# Patient Record
Sex: FEMALE | Race: ASIAN
[De-identification: names, ages, dates, MRNs, and addresses within clinical notes are randomized per-mention and may not be internally consistent; named-entity substitution may affect disease eponyms.]

---

## 2017-06-19 ENCOUNTER — HOSPITAL ENCOUNTER (EMERGENCY)
Dept: HOSPITAL 80 - FED | Age: 37
Discharge: HOME | End: 2017-06-19
Payer: COMMERCIAL

## 2017-06-19 VITALS — SYSTOLIC BLOOD PRESSURE: 125 MMHG | TEMPERATURE: 98.4 F | OXYGEN SATURATION: 96 % | DIASTOLIC BLOOD PRESSURE: 69 MMHG

## 2017-06-19 VITALS — RESPIRATION RATE: 16 BRPM | HEART RATE: 84 BPM

## 2017-06-19 DIAGNOSIS — R11.2: Primary | ICD-10-CM

## 2017-06-19 DIAGNOSIS — F41.9: ICD-10-CM

## 2017-06-19 LAB
% IMMATURE GRANULYOCYTES: 0.4 % (ref 0–1.1)
ABSOLUTE IMMATURE GRANULOCYTES: 0.06 10^3/UL (ref 0–0.1)
ABSOLUTE NRBC COUNT: 0 10^3/UL (ref 0–0.01)
ADD DIFF?: NO
ADD MORPH?: NO
ADD SCAN?: NO
ALBUMIN SERPL-MCNC: 3.8 G/DL (ref 3.5–5)
ALP SERPL-CCNC: 54 IU/L (ref 38–126)
ALT SERPL-CCNC: 50 IU/L (ref 9–52)
ANION GAP SERPL CALC-SCNC: 11 MEQ/L (ref 8–16)
AST SERPL-CCNC: 36 IU/L (ref 14–46)
ATYPICAL LYMPHOCYTE FLAG: 0 (ref 0–99)
BILIRUB SERPL-MCNC: 0.6 MG/DL (ref 0.1–1.4)
BILIRUBIN-CONJUGATED: 0.3 MG/DL (ref 0–0.5)
BILIRUBIN-UNCONJUGATED: 0.3 MG/DL (ref 0–1.1)
CALCIUM SERPL-MCNC: 9.5 MG/DL (ref 8.5–10.4)
CHLORIDE SERPL-SCNC: 104 MEQ/L (ref 97–110)
CO2 SERPL-SCNC: 24 MEQ/L (ref 22–31)
COLOR UR: YELLOW
CREAT SERPL-MCNC: 0.5 MG/DL (ref 0.6–1)
ERYTHROCYTE [DISTWIDTH] IN BLOOD BY AUTOMATED COUNT: 12.3 % (ref 11.5–15.2)
FRAGMENT RBC FLAG: 0 (ref 0–99)
GFR SERPL CREATININE-BSD FRML MDRD: > 60 ML/MIN/{1.73_M2}
GLUCOSE SERPL-MCNC: 111 MG/DL (ref 70–100)
HCT VFR BLD CALC: 40.8 % (ref 38–47)
HGB BLD-MCNC: 13.8 G/DL (ref 12.6–16.3)
LEFT SHIFT FLG: 0 (ref 0–99)
LIPEMIA HEMOLYSIS FLAG: 90 (ref 0–99)
MCH RBC BLDCO QN: 29.7 PG (ref 27.9–34.1)
MCHC RBC AUTO-ENTMCNC: 33.8 G/DL (ref 32.4–36.7)
MCV RBC AUTO: 87.9 FL (ref 81.5–99.8)
NITRITE UR QL STRIP: NEGATIVE
NRBC-AUTO%: 0 % (ref 0–0.2)
PH UR STRIP: 5 [PH] (ref 5–7.5)
PLATELET # BLD: 291 10^3/UL (ref 150–400)
PLATELET CLUMPS FLAG: 20 (ref 0–99)
PMV BLD AUTO: 9 FL (ref 8.7–11.7)
POTASSIUM SERPL-SCNC: 3.7 MEQ/L (ref 3.5–5.2)
PROT SERPL-MCNC: 7.4 G/DL (ref 6.3–8.2)
RBC # BLD AUTO: 4.64 10^6/UL (ref 4.18–5.33)
SODIUM SERPL-SCNC: 139 MEQ/L (ref 134–144)
SP GR UR STRIP: 1.02 (ref 1–1.03)
TROPONIN I SERPL-MCNC: < 0.012 NG/ML (ref 0–0.03)

## 2017-06-19 NOTE — EDPHY
H & P


Stated Complaint: N,V CHEST AND ARM PAIN WITH SOB AFTER EATING SALMON


HPI/ROS: 





HPI





CHIEF COMPLAINT:  Nausea, vomiting, shortness of breath, anterior chest wall 

pain after eating salmon





HISTORY OF PRESENT ILLNESS:  this patient very pleasant 37-year-old female, 

denies any significant medical history does not take any daily medications, no 

history of DVT PE or cardiac disease, presents emergency room after eating 

salmon last night a from the grocery store she states approximately an hour 

after eating she got nauseous and vomited twice.  She states she ate the same 

around 9:00 p.m. last night.  Around 10:00 p.m. she became sick.  She states 

all night long she has felt anxious, she felt as if she has had shortness of 

breath and anterior chest wall discomfort with pain when you touch her chest 

wall.  States her muscles hurt her as well. Denies diarrhea.  She does tell me 

also her abdomen is bloated.  No diarrhea.  No fever.  Has had chills as well.  

Denies pleuritic pain.  Or hemoptysis.  Denies calf pain or swelling. Denies 

cold or heat reversal.





Past Medical History:  No significant medical history





Past Surgical History:  No recent surgical history





Social History:  Denies daily use of drugs alcohol tobacco products





Family History:  Father has heart disease








ROS   


REVIEW OF SYSTEMS:


A comprehensive 10 point review of systems is otherwise negative aside from 

elements mentioned in the history of present illness.








Exam   


Constitutional  anxious, triage nursing summary reviewed, vital signs reviewed, 

awake/alert. 


Eyes   normal conjunctivae and sclera, EOMI, PERRLA. 


HENT   normal inspection, atraumatic, moist mucus membranes, no epistaxis, neck 

supple/ no meningismus, no raccoon eyes. 


Respiratory   clear to auscultation bilaterally, normal breath sounds, no 

respiratory distress, no wheezing. 


Cardiovascular chest wall:  Tender palpation over the anterior chest wall 

bilaterally.  rate normal, regular rhythm, no murmur, no edema, distal pulses 

normal. 


Gastrointestinal   soft, non-tender, no rebound, no guarding, normal bowel 

sounds, no distension, no pulsatile mass. 


Genitourinary   no CVA tenderness. 


Musculoskeletal  no midline vertebral tenderness, full range of motion, no calf 

swelling, no tenderness of extremities, no meningismus, good pulses, 

neurovascularly intact.


Skin   pink, warm, & dry, no rash, skin atraumatic. No hot and cold reversal/

sensitivity.


Neurologic   awake, alert and oriented x 3, AAOx3, moves all 4 extremities 

equally, motor intact, sensory intact, CN II-XII intact, normal cerebellar, 

normal vision, normal speech. 


Psychiatric  anxious


Heme/Lymph/Immune   no lymphadenopathy.





Differential Diagnosis:  Includes but is not limited to in a particular order, 

dehydration, electrolyte disturbance, pneumonia, urinary tract infection, food-

borne illness, allergic reaction, Scombroid tox.





Medical Decision Making:  Plan for this patient IV establishment, full cardiac 

monitor, EKG, troponin, D-dimer, chest x-ray, IV fluids, Ativan for anxiety 

Benadryl for nausea.





Re-evaluation:








EKG interpretation by me on record in [employer] system.  Impression time of 

EKG 6:17 a.m., sinus rhythm rate of 85 I do not appreciate acute ischemic 

changes specifically no ST elevation, ST depression significant T-wave 

abnormality.





ED x-ray chest one view:  This is negative for acute cardiopulmonary disease.





0636AM:  Re-evaluation this time patient is resting comfortably no acute 

distress.  Feels better after IV Ativan and Benadryl.  She tells me she is 

relaxed.  Anxiety is resolved.  It is noted this patient does have a positive D-

dimer.  She has a nonischemic EKG.  Chest x-ray is unremarkable.  Due to the 

positive D-dimer chest pain shortness of breath she will have a CT angiogram of 

her chest.  I think PE is unlikely.  She is feeling better after anxiety 

medicine and Benadryl.  Blood work is reassuring.  I feel a for CT scan is 

negative given that she has anterior musculoskeletal chest wall pain is 

reproducible on exam unlikely to be acute coronary syndrome arm pulmonary 

embolism.  Her CT angiogram is negative will allow her to go home.  It is 

possible with nausea vomiting that this is food related illness.








0719AM:  I did re-evaluate this patient this time she remains stable. She 

denies chest pain shortness of breath.  She tells me she feels better after IV 

Ativan IV Benadryl.  She does tell me she sleepy.  Her EKG is nonischemic 

troponin negative.  D-dimer is positive CT angiogram has been performed.  No 

results at this time.  I do feel that if the CT angiogram is negative the 

patient go home.  Nausea prescription for Zofran.





This time I did sign this patient over Dr. Louis follow-up CT angiogram results.

  I feel that if the CT is negative she can go home.  Did explain this the 

patient she does understand return emergency room she develops worsening pain, 

shortness of breath, abdominal pain, nausea or vomiting. Most likely cause of 

nausea, vomiting, anterior chest wall pain anxiety this possible food-borne 

illness.





CT scan of the chest angio  The results of the study are no pulmonary embolism, 

no dissection.  Unremarkable CT angiogram of the chest.  The study was read by 

Dr. Pete.  I viewed the images myself on the PACS system.





0724am:  I DID GO OVER THIS PATIENT'S CT RESULTS.  SHE IS ALSO COMFORTABLE 

DISCHARGE PLANNING.  ZOFRAN FOR HOME.  SHE P.O. CHALLENGE WELL HERE.  OUT 

VOMITING. NO ABDOMINAL PAIN.  AMBULATED WELL.  SAFE FOR DISCHARGE. SHE 

UNDERSTANDS IF SHE DEVELOPS WORSENING SYMPTOMS INCLUDES NAUSEA, VOMITING, CHEST 

PAIN, SHORTNESS OF BREATH, FEVER SHE HAS RETURN EMERGENCY ROOM.  HER WORKUP 

HERE IN THE EMERGENCY ROOM IS BEEN UNEVENTFUL.  ONLY ABNORMAL TEST IS POSITIVE D

-DIMER.  MOST LIKELY CAUSE OF CONSTELLATION OF SYMPTOMS IS FOOD-BORNE ILLNESS.


Source: Patient





- Personal History


LMP (Females 10-55): 22-28 Days Ago


Current Tetanus/Diphtheria Vaccine: Unsure


Current Tetanus Diphtheria and Acellular Pertussis (TDAP): Unsure





- Medical/Surgical History


Hx Asthma: No


Hx Chronic Respiratory Disease: No


Hx Diabetes: No


Hx Cardiac Disease: No


Hx Renal Disease: No


Hx Cirrhosis: No


Hx Alcoholism: No


Hx HIV/AIDS: No


Hx Splenectomy or Spleen Trauma: No


Other PMH: pregnancy induced diabetis





- Social History


Smoking Status: Never smoked


Constitutional: 


 Initial Vital Signs











Temperature (C)  37.0 C   06/19/17 05:44


 


Heart Rate  94   06/19/17 05:44


 


Respiratory Rate  18   06/19/17 05:44


 


Blood Pressure  121/72 H  06/19/17 05:44


 


O2 Sat (%)  97   06/19/17 05:44








 











O2 Delivery Mode               Room Air














Allergies/Adverse Reactions: 


 





No Known Allergies Allergy (Unverified 06/19/17 05:47)


 








Home Medications: 














 Medication  Instructions  Recorded


 


Ondansetron HCl [Zofran] 4 mg PO Q4-6PRN PRN #10 tablet 06/19/17














Medical Decision Making





- Data Points


Laboratory Results: 


 Laboratory Results





 06/19/17 06:07 





 06/19/17 06:07 





 











  06/19/17 06/19/17 06/19/17





  06:54 06:07 06:07


 


WBC      





    


 


RBC      





    


 


Hgb      





    


 


Hct      





    


 


MCV      





    


 


MCH      





    


 


MCHC      





    


 


RDW      





    


 


Plt Count      





    


 


MPV      





    


 


Neut % (Auto)      





    


 


Lymph % (Auto)      





    


 


Mono % (Auto)      





    


 


Eos % (Auto)      





    


 


Baso % (Auto)      





    


 


Nucleat RBC Rel Count      





    


 


Absolute Neuts (auto)      





    


 


Absolute Lymphs (auto)      





    


 


Absolute Monos (auto)      





    


 


Absolute Eos (auto)      





    


 


Absolute Basos (auto)      





    


 


Absolute Nucleated RBC      





    


 


Immature Gran %      





    


 


Immature Gran #      





    


 


D-Dimer    0.94 ug/mLFEU H ug/mLFEU  





    (0.00-0.50)  


 


Sodium      





    


 


Potassium      





    


 


Chloride      





    


 


Carbon Dioxide      





    


 


Anion Gap      





    


 


BUN      





    


 


Creatinine      





    


 


Estimated GFR      





    


 


Glucose      





    


 


Calcium      





    


 


Total Bilirubin      





    


 


Conjugated Bilirubin      





    


 


Unconjugated Bilirubin      





    


 


AST      





    


 


ALT      





    


 


Alkaline Phosphatase      





    


 


Troponin I      





    


 


Total Protein      





    


 


Albumin      





    


 


Lipase      





    


 


Beta HCG, Qual      NEGATIVE 





    


 


Urine Color  YELLOW     





    


 


Urine Appearance  CLEAR     





    


 


Urine pH  5.0     





   (5.0-7.5)   


 


Ur Specific Gravity  1.024     





   (1.002-1.030)   


 


Urine Protein  NEGATIVE     





   (NEGATIVE)   


 


Urine Ketones  NEGATIVE     





   (NEGATIVE)   


 


Urine Blood  NEGATIVE     





   (NEGATIVE)   


 


Urine Nitrate  NEGATIVE     





   (NEGATIVE)   


 


Urine Bilirubin  NEGATIVE     





   (NEGATIVE)   


 


Urine Urobilinogen  NEGATIVE EU EU    





   (0.2-1.0)   


 


Ur Leukocyte Esterase  NEGATIVE     





   (NEGATIVE)   


 


Urine Glucose  NEGATIVE     





   (NEGATIVE)   














  06/19/17 06/19/17





  06:07 06:07


 


WBC    13.54 10^3/uL H 10^3/uL





    (3.80-9.50) 


 


RBC    4.64 10^6/uL 10^6/uL





    (4.18-5.33) 


 


Hgb    13.8 g/dL g/dL





    (12.6-16.3) 


 


Hct    40.8 % %





    (38.0-47.0) 


 


MCV    87.9 fL fL





    (81.5-99.8) 


 


MCH    29.7 pg pg





    (27.9-34.1) 


 


MCHC    33.8 g/dL g/dL





    (32.4-36.7) 


 


RDW    12.3 % %





    (11.5-15.2) 


 


Plt Count    291 10^3/uL 10^3/uL





    (150-400) 


 


MPV    9.0 fL fL





    (8.7-11.7) 


 


Neut % (Auto)    82.8 % H %





    (39.3-74.2) 


 


Lymph % (Auto)    11.8 % L %





    (15.0-45.0) 


 


Mono % (Auto)    4.7 % %





    (4.5-13.0) 


 


Eos % (Auto)    0.2 % L %





    (0.6-7.6) 


 


Baso % (Auto)    0.1 % L %





    (0.3-1.7) 


 


Nucleat RBC Rel Count    0.0 % %





    (0.0-0.2) 


 


Absolute Neuts (auto)    11.21 10^3/uL H 10^3/uL





    (1.70-6.50) 


 


Absolute Lymphs (auto)    1.60 10^3/uL 10^3/uL





    (1.00-3.00) 


 


Absolute Monos (auto)    0.63 10^3/uL 10^3/uL





    (0.30-0.80) 


 


Absolute Eos (auto)    0.03 10^3/uL 10^3/uL





    (0.03-0.40) 


 


Absolute Basos (auto)    0.01 10^3/uL L 10^3/uL





    (0.02-0.10) 


 


Absolute Nucleated RBC    0.00 10^3/uL 10^3/uL





    (0-0.01) 


 


Immature Gran %    0.4 % %





    (0.0-1.1) 


 


Immature Gran #    0.06 10^3/uL 10^3/uL





    (0.00-0.10) 


 


D-Dimer    





   


 


Sodium  139 mEq/L mEq/L  





   (134-144)  


 


Potassium  3.7 mEq/L mEq/L  





   (3.5-5.2)  


 


Chloride  104 mEq/L mEq/L  





   ()  


 


Carbon Dioxide  24 mEq/l mEq/l  





   (22-31)  


 


Anion Gap  11 mEq/L mEq/L  





   (8-16)  


 


BUN  13 mg/dL mg/dL  





   (7-23)  


 


Creatinine  0.5 mg/dL L mg/dL  





   (0.6-1.0)  


 


Estimated GFR  > 60   





   


 


Glucose  111 mg/dL H mg/dL  





   ()  


 


Calcium  9.5 mg/dL mg/dL  





   (8.5-10.4)  


 


Total Bilirubin  0.6 mg/dL mg/dL  





   (0.1-1.4)  


 


Conjugated Bilirubin  0.3 mg/dL mg/dL  





   (0.0-0.5)  


 


Unconjugated Bilirubin  0.3 mg/dL mg/dL  





   (0.0-1.1)  


 


AST  36 IU/L IU/L  





   (14-46)  


 


ALT  50 IU/L IU/L  





   (9-52)  


 


Alkaline Phosphatase  54 IU/L IU/L  





   ()  


 


Troponin I  < 0.012 ng/mL ng/mL  





   (0-0.034)  


 


Total Protein  7.4 g/dL g/dL  





   (6.3-8.2)  


 


Albumin  3.8 g/dL g/dL  





   (3.5-5.0)  


 


Lipase  124.0 IU/L IU/L  





   ()  


 


Beta HCG, Qual    





   


 


Urine Color    





   


 


Urine Appearance    





   


 


Urine pH    





   


 


Ur Specific Gravity    





   


 


Urine Protein    





   


 


Urine Ketones    





   


 


Urine Blood    





   


 


Urine Nitrate    





   


 


Urine Bilirubin    





   


 


Urine Urobilinogen    





   


 


Ur Leukocyte Esterase    





   


 


Urine Glucose    





   











Medications Given: 


 








Discontinued Medications





Diphenhydramine HCl (Benadryl Injection)  12.5 mg IVP EDNOW ONE


   Stop: 06/19/17 05:56


   Last Admin: 06/19/17 06:10 Dose:  12.5 mg


Sodium Chloride (Ns)  1,000 mls @ 0 mls/hr IV ONCE ONE; Wide Open


   PRN Reason: Protocol


   Stop: 06/19/17 05:55


   Last Admin: 06/19/17 06:10 Dose:  1,000 mls


Lorazepam (Ativan Injection)  0.5 mg IVP EDNOW ONE


   Stop: 06/19/17 05:56


   Last Admin: 06/19/17 06:11 Dose:  0.5 mg








Departure





- Departure


Disposition: Home, Routine, Self-Care


Clinical Impression: 


 Anxiety





Vomiting


Qualifiers:


 Vomiting type: unspecified Vomiting Intractability: intractable Nausea presence

: with nausea Qualified Code(s): R11.2 - Nausea with vomiting, unspecified





Condition: Good


Instructions:  Acute Nausea and Vomiting (ED), Anxiety (ED)


Additional Instructions: 


1. [place] diet for next 24-48 hours.


2. Return emergency room if develops worsening symptoms includes severe vomiting

, abdominal pain, fever questions or concerns.


Referrals: 


Jessie Perez MD [Primary Care Provider] - As per Instructions


Prescriptions: 


Ondansetron HCl [Zofran] 4 mg PO Q4-6PRN PRN #10 tablet


 PRN Reason: Nausea/Vomiting, Use 1st

## 2017-06-19 NOTE — CPEKG
Heart Rate: 85

RR Interval: 706

P-R Interval: 188

QRSD Interval: 74

QT Interval: 400

QTC Interval: 476

P Axis: 59

QRS Axis: 62

T Wave Axis: 30

EKG Severity - BORDERLINE ECG -

EKG Impression: SINUS RHYTHM

EKG Impression: BORDERLINE PROLONGED QT INTERVAL

Electronically Signed By: Bijan Scott 20-Jun-2017 09:03:08

## 2017-06-29 ENCOUNTER — HOSPITAL ENCOUNTER (OUTPATIENT)
Dept: HOSPITAL 80 - FIMAGING | Age: 37
End: 2017-06-29
Attending: FAMILY MEDICINE
Payer: COMMERCIAL

## 2017-06-29 DIAGNOSIS — M25.562: Primary | ICD-10-CM

## 2018-02-07 ENCOUNTER — HOSPITAL ENCOUNTER (OUTPATIENT)
Dept: HOSPITAL 80 - FIMAGING | Age: 38
End: 2018-02-07
Attending: FAMILY MEDICINE
Payer: COMMERCIAL

## 2018-02-07 DIAGNOSIS — D18.09: Primary | ICD-10-CM

## 2018-02-07 DIAGNOSIS — R94.5: ICD-10-CM

## 2018-03-06 ENCOUNTER — HOSPITAL ENCOUNTER (EMERGENCY)
Dept: HOSPITAL 80 - FED | Age: 38
Discharge: HOME | End: 2018-03-06
Payer: COMMERCIAL

## 2018-03-06 VITALS — HEART RATE: 69 BPM | TEMPERATURE: 98.1 F | OXYGEN SATURATION: 98 %

## 2018-03-06 VITALS — DIASTOLIC BLOOD PRESSURE: 70 MMHG | RESPIRATION RATE: 16 BRPM | SYSTOLIC BLOOD PRESSURE: 112 MMHG

## 2018-03-06 DIAGNOSIS — Z79.84: ICD-10-CM

## 2018-03-06 DIAGNOSIS — E11.9: ICD-10-CM

## 2018-03-06 DIAGNOSIS — M43.6: Primary | ICD-10-CM

## 2018-03-06 NOTE — EDPHY
H & P


Time Seen by Provider: 03/06/18 18:54


HPI/ROS: 





CHIEF COMPLAINT:  Left-sided neck pain





HISTORY OF PRESENT ILLNESS:  Patient awakened yesterday morning with neck pain.

  Feels very stiff and hurts more if she moves her head.  No known injury.  Not 

associated with headache or vertigo or recent trauma or injury.  No fever or 

chills.  No weakness or numbness in arms.  Symptoms severe and not really 

helped a lot by ibuprofen.





REVIEW OF SYSTEMS:


Eye: no change in vision


ENT: no sore throat or difficulty swallowing, no change in voice.


Cardiac: no chest pain or syncope


Pulmonary: no cough or SOB


Abdomen: no vomiting, diarrhea, abdominal pain, does have some associated nausea

, when the pain spikes


Musculoskeletal:  HPI


Skin: no rash


Neuro: no headache


Constitutional: no fever


: no urinary symptoms


No new medications.


A comprehensive 10 point review of systems is otherwise negative aside from 

elements mentioned in the history of present illness.





PAST MEDICAL HISTORY:  Diabetes on metformin





Social history:  No drugs, works as an 





General Appearance: Alert and conversant, cooperative.


Eyes: No scleral  icterus. 


ENT, Mouth: Normal mucous membranes.


Respiratory: Normal respiratory effort, breath sounds equal, lungs are clear to 

auscultation.


Cardiovascular:  Regular rate and rhythm.


Gastrointestinal:  Abdomen is soft and non tender.


Neurological: Alert, face symmetric, normal motor and sensory in extremities.   

Normal strength in deltoids, biceps, triceps, wrist extensors, and intrinsics.


Skin: Warm and dry, no rashes.  No zoster.


Musculoskeletal:  Patient has significant left-sided trapezius muscle and 

paraspinal lower cervical muscle tenderness.  Reproduces her symptoms by 

turning her head to the right and pushing her ear toward her right shoulder.


Psychiatric: Not agitated.  Of moderately anxious.





Emergency Department course/MDM:





Patient requesting imaging of her neck.  Plain x-rays ordered.  I think it is 

unlikely she has a vertebral or carotid dissection or deep space neck infection 

or cervical spine fracture.  Much more likely to be a muscle spasm or 

torticollis.


Does not clinically have meningitis, acute coronary syndrome, dystonic reaction.





Trigger point injection discussed and consented.  1% xylocaine without 

epinephrine 3 mL injected into the trapezius area of greatest tenderness, under 

standard sterile conditions.





X-rays discussed, nonsteroidals and small amount of prescription narcotic for 

sleep.  Warned to symptoms may last the next week.


Smoking Status: Never smoked


Constitutional: 


 Initial Vital Signs











Temperature (C)  36.7 C   03/06/18 18:43


 


Heart Rate  69   03/06/18 18:43


 


Respiratory Rate  18   03/06/18 18:43


 


Blood Pressure  119/76   03/06/18 18:43


 


O2 Sat (%)  98   03/06/18 18:43








 











O2 Delivery Mode               Room Air














Allergies/Adverse Reactions: 


 





No Known Allergies Allergy (Verified 03/06/18 18:43)


 








Home Medications: 














 Medication  Instructions  Recorded


 


Hydrocodone/APAP 5/325 [Norco 1 tab PO Q4-6PRN PRN #11 tab 03/06/18





5/325]  


 


metFORMIN HCL [Glucophage 500 mg 500 mg PO 03/06/18





(*)]  














Medical Decision Making





- Diagnostics


Imaging Results: 


 Imaging Impressions





Cervical Spine X-Ray  03/06/18 19:06


Impression: Negative for fracture. Straightening of the cervical curvature may 

reflect muscle spasm.











Imaging: I viewed and interpreted images myself





- Data Points


Medications Given: 


 








Discontinued Medications





Ibuprofen (Motrin)  600 mg PO EDNOW ONE


   Stop: 03/06/18 19:19


   Last Admin: 03/06/18 19:26 Dose:  600 mg








Departure





- Departure


Disposition: Home, Routine, Self-Care


Clinical Impression: 


 Torticollis, acute





Condition: Good


Instructions:  Spasmodic Torticollis (ED)


Additional Instructions: 


Ibuprofen 600 mg orally every 6-8 hours as needed for pain.  Heating pad to the 

area as needed.


Referrals: 


Jessie Perez MD [Primary Care Provider] - As per Instructions


Prescriptions: 


Hydrocodone/APAP 5/325 [Norco 5/325] 1 tab PO Q4-6PRN PRN #11 tab


 PRN Reason: For Pain